# Patient Record
Sex: MALE | Race: WHITE | NOT HISPANIC OR LATINO | Employment: FULL TIME | URBAN - METROPOLITAN AREA
[De-identification: names, ages, dates, MRNs, and addresses within clinical notes are randomized per-mention and may not be internally consistent; named-entity substitution may affect disease eponyms.]

---

## 2018-12-21 ENCOUNTER — OFFICE VISIT (OUTPATIENT)
Dept: URGENT CARE | Facility: CLINIC | Age: 33
End: 2018-12-21
Payer: COMMERCIAL

## 2018-12-21 ENCOUNTER — TELEPHONE (OUTPATIENT)
Dept: DERMATOLOGY | Facility: CLINIC | Age: 33
End: 2018-12-21

## 2018-12-21 VITALS
TEMPERATURE: 97 F | DIASTOLIC BLOOD PRESSURE: 68 MMHG | BODY MASS INDEX: 21.69 KG/M2 | WEIGHT: 135 LBS | HEART RATE: 75 BPM | OXYGEN SATURATION: 98 % | SYSTOLIC BLOOD PRESSURE: 116 MMHG | RESPIRATION RATE: 18 BRPM | HEIGHT: 66 IN

## 2018-12-21 DIAGNOSIS — L02.811 ABSCESS OF HEAD, EXCEPT FACE: Primary | ICD-10-CM

## 2018-12-21 PROCEDURE — 99204 OFFICE O/P NEW MOD 45 MIN: CPT | Mod: S$GLB,,, | Performed by: NURSE PRACTITIONER

## 2018-12-21 NOTE — PROGRESS NOTES
"Subjective:       Patient ID: Daniel Peoples is a 33 y.o. male.    Vitals:  height is 5' 6" (1.676 m) and weight is 61.2 kg (135 lb). His temperature is 96.9 °F (36.1 °C). His blood pressure is 116/68 and his pulse is 75. His respiration is 18 and oxygen saturation is 98%.     Chief Complaint: Abscess (on left back of head)    Patient is from  [place], he noticed a bump on the back of head last week. Patient states that he has a history of folliculitis,      Abscess   Chronicity:  NewProgression Since Onset: gradually worsening  Size:  3-5cm  Location:  Head/neck  Associated Symptoms: no fever, no chills  Characteristics: swelling        Constitution: Negative for chills and fever.   HENT: Negative for facial swelling and sore throat.    Neck: Negative for painful lymph nodes.   Eyes: Negative for eye itching and eyelid swelling.   Respiratory: Negative for cough.    Musculoskeletal: Negative for joint pain and joint swelling.   Skin: Positive for rash, erythema and abscess. Negative for color change, pale, wound, abrasion, laceration, lesion, skin thickening/induration, puncture wound, avulsion and hives.   Allergic/Immunologic: Negative for environmental allergies, immunocompromised state and hives.   Neurological: Positive for headaches. Negative for dizziness, history of vertigo, light-headedness and altered mental status.   Hematologic/Lymphatic: Negative for swollen lymph nodes.   Psychiatric/Behavioral: Negative for altered mental status.             Objective:      Physical Exam   Constitutional: He is oriented to person, place, and time. Vital signs are normal. He appears well-developed and well-nourished.  Non-toxic appearance. He does not have a sickly appearance. He does not appear ill. No distress.   HENT:   Head: Normocephalic and atraumatic. Head is without abrasion, without contusion and without laceration.       Right Ear: Hearing, tympanic membrane, external ear and ear canal normal.   Left Ear: " Hearing, tympanic membrane, external ear and ear canal normal.   Nose: Nose normal. No mucosal edema or rhinorrhea.   Mouth/Throat: Uvula is midline, oropharynx is clear and moist and mucous membranes are normal. No posterior oropharyngeal edema or posterior oropharyngeal erythema. Tonsils are 1+ on the right. Tonsils are 1+ on the left. No tonsillar exudate.   Approximate Quarter size area of swelling, redness, tenderness and firmness noted to the posterior scalp over the left parietal bone.  See attached picture.   Eyes: Conjunctivae, EOM and lids are normal. Pupils are equal, round, and reactive to light.   Neck: Trachea normal, normal range of motion, full passive range of motion without pain and phonation normal. Neck supple.   Cardiovascular: Normal rate, regular rhythm, S1 normal, S2 normal, normal heart sounds and normal pulses.   Pulmonary/Chest: Effort normal and breath sounds normal. No stridor. No respiratory distress. He has no decreased breath sounds. He has no wheezes. He has no rhonchi. He has no rales.   Musculoskeletal: Normal range of motion.   Lymphadenopathy:     He has no cervical adenopathy.   Neurological: He is alert and oriented to person, place, and time.   Skin: Skin is warm, dry and intact. Capillary refill takes less than 2 seconds. No abrasion, no bruising, no burn, no ecchymosis, no laceration, no lesion, no petechiae and no rash noted. There is erythema.        Approximate Quarter size area of swelling, redness, tenderness and firmness noted to the posterior scalp over the left parietal bone.  Area is sun-exposed aligned among patient's hairline.   See attached picture.   Psychiatric: He has a normal mood and affect. His speech is normal and behavior is normal. Judgment and thought content normal. Cognition and memory are normal.   Nursing note and vitals reviewed.      Assessment:       1. Abscess of head, except face        Plan:       33 year old male with a reported history of  "recurrent folliculitis managed by his dermatologist in [place].  Denies any injury to the area.    States the abscess has grew larger in size while he has "messed" with it during this time.  Advised patient to avoid manipulating the area but that he needed follow-up to determine if this was indeed folliculitis versus an abscess or other condition.      Patient advised that the dermatology department would contact him directly on today to try and schedule a follow-up for managemnt of his condition.  Patient verbalized understanding and agreed to plan of care.  He was also given our number at the urgent care if he had any questions or needed help with his appointment.    Patient called back at approximately 445 pm stating he was not able to establish an appointment before he left on Monday.  Asked for the nearest ER within our system.  Provided estimated wait times and locations of closest ERs.  Patient collected the information given, verbalized understanding and agreed with plan of care.  Denied any further questions or concerns.     Abscess of head, except face  -     Ambulatory referral to Dermatology      Patient Instructions     General Referral to [facility]  You were referred to [facility] for Follow-up Care.    Please call [phone] to schedule your appointment.    Please go to the Emergency Department for any concerns or worsening of condition.  Please follow up with your primary care doctor or specialist in the next 48-72hrs as needed.    If you  smoke, please stop smoking.       "

## 2018-12-21 NOTE — TELEPHONE ENCOUNTER
Spoke to pt to schedule an appt.and informed pt that no providers were in today to be seen by.Pt will try to schedule another facility before returning home.    BEV

## 2018-12-21 NOTE — TELEPHONE ENCOUNTER
----- Message from Liana Queen sent at 12/21/2018  3:44 PM CST -----  Urgent Care called us to get pt in today for an abscess of the head. I could not get in contact with anyone on my escalation list so sending IB. Urgent Care said pt lives in [place] and going home Monday so they were wanting to get him seen this afternoon or Monday. Please call pt to schedule. Thank you!

## 2018-12-26 ENCOUNTER — TELEPHONE (OUTPATIENT)
Dept: URGENT CARE | Facility: CLINIC | Age: 33
End: 2018-12-26

## 2022-12-30 NOTE — PATIENT INSTRUCTIONS
General Referral to [facility]  You were referred to [facility] for Follow-up Care.    Please call [phone] to schedule your appointment.    Please go to the Emergency Department for any concerns or worsening of condition.  Please follow up with your primary care doctor or specialist in the next 48-72hrs as needed.    If you  smoke, please stop smoking.  
None known